# Patient Record
Sex: FEMALE | Race: OTHER | HISPANIC OR LATINO | URBAN - METROPOLITAN AREA
[De-identification: names, ages, dates, MRNs, and addresses within clinical notes are randomized per-mention and may not be internally consistent; named-entity substitution may affect disease eponyms.]

---

## 2017-03-03 ENCOUNTER — OUTPATIENT (OUTPATIENT)
Dept: OUTPATIENT SERVICES | Facility: HOSPITAL | Age: 32
LOS: 1 days | Discharge: HOME | End: 2017-03-03

## 2017-05-15 ENCOUNTER — TRANSCRIPTION ENCOUNTER (OUTPATIENT)
Age: 32
End: 2017-05-15

## 2017-06-27 DIAGNOSIS — R10.9 UNSPECIFIED ABDOMINAL PAIN: ICD-10-CM

## 2017-08-28 ENCOUNTER — TRANSCRIPTION ENCOUNTER (OUTPATIENT)
Age: 32
End: 2017-08-28

## 2018-02-02 ENCOUNTER — OUTPATIENT (OUTPATIENT)
Dept: OUTPATIENT SERVICES | Facility: HOSPITAL | Age: 33
LOS: 1 days | Discharge: HOME | End: 2018-02-02

## 2018-02-02 DIAGNOSIS — Z00.00 ENCOUNTER FOR GENERAL ADULT MEDICAL EXAMINATION WITHOUT ABNORMAL FINDINGS: ICD-10-CM

## 2018-07-06 ENCOUNTER — EMERGENCY (EMERGENCY)
Facility: HOSPITAL | Age: 33
LOS: 0 days | Discharge: HOME | End: 2018-07-06
Attending: EMERGENCY MEDICINE | Admitting: EMERGENCY MEDICINE

## 2018-07-06 VITALS
TEMPERATURE: 99 F | RESPIRATION RATE: 16 BRPM | HEART RATE: 91 BPM | DIASTOLIC BLOOD PRESSURE: 72 MMHG | SYSTOLIC BLOOD PRESSURE: 137 MMHG | OXYGEN SATURATION: 100 %

## 2018-07-06 VITALS
DIASTOLIC BLOOD PRESSURE: 90 MMHG | OXYGEN SATURATION: 100 % | HEART RATE: 102 BPM | TEMPERATURE: 97 F | RESPIRATION RATE: 18 BRPM | SYSTOLIC BLOOD PRESSURE: 158 MMHG

## 2018-07-06 DIAGNOSIS — R10.9 UNSPECIFIED ABDOMINAL PAIN: ICD-10-CM

## 2018-07-06 DIAGNOSIS — R10.2 PELVIC AND PERINEAL PAIN: ICD-10-CM

## 2018-07-06 LAB
ALBUMIN SERPL ELPH-MCNC: 4.5 G/DL — SIGNIFICANT CHANGE UP (ref 3.5–5.2)
ALP SERPL-CCNC: 64 U/L — SIGNIFICANT CHANGE UP (ref 30–115)
ALT FLD-CCNC: 14 U/L — SIGNIFICANT CHANGE UP (ref 0–41)
ANION GAP SERPL CALC-SCNC: 17 MMOL/L — HIGH (ref 7–14)
APPEARANCE UR: (no result)
AST SERPL-CCNC: 16 U/L — SIGNIFICANT CHANGE UP (ref 0–41)
BACTERIA # UR AUTO: (no result) /HPF
BILIRUB SERPL-MCNC: <0.2 MG/DL — SIGNIFICANT CHANGE UP (ref 0.2–1.2)
BILIRUB UR-MCNC: NEGATIVE — SIGNIFICANT CHANGE UP
BUN SERPL-MCNC: 7 MG/DL — LOW (ref 10–20)
CALCIUM SERPL-MCNC: 9.4 MG/DL — SIGNIFICANT CHANGE UP (ref 8.5–10.1)
CHLORIDE SERPL-SCNC: 98 MMOL/L — SIGNIFICANT CHANGE UP (ref 98–110)
CO2 SERPL-SCNC: 23 MMOL/L — SIGNIFICANT CHANGE UP (ref 17–32)
COLOR SPEC: (no result)
CREAT SERPL-MCNC: 0.7 MG/DL — SIGNIFICANT CHANGE UP (ref 0.7–1.5)
DIFF PNL FLD: (no result)
EPI CELLS # UR: (no result) /HPF
GLUCOSE SERPL-MCNC: 87 MG/DL — SIGNIFICANT CHANGE UP (ref 70–99)
GLUCOSE UR QL: NEGATIVE MG/DL — SIGNIFICANT CHANGE UP
HCG SERPL QL: NEGATIVE — SIGNIFICANT CHANGE UP
HCT VFR BLD CALC: 39.2 % — SIGNIFICANT CHANGE UP (ref 37–47)
HGB BLD-MCNC: 12.9 G/DL — SIGNIFICANT CHANGE UP (ref 12–16)
KETONES UR-MCNC: (no result)
LEUKOCYTE ESTERASE UR-ACNC: (no result)
MCHC RBC-ENTMCNC: 29 PG — SIGNIFICANT CHANGE UP (ref 27–31)
MCHC RBC-ENTMCNC: 32.9 G/DL — SIGNIFICANT CHANGE UP (ref 32–37)
MCV RBC AUTO: 88.1 FL — SIGNIFICANT CHANGE UP (ref 81–99)
NITRITE UR-MCNC: NEGATIVE — SIGNIFICANT CHANGE UP
NRBC # BLD: 0 /100 WBCS — SIGNIFICANT CHANGE UP (ref 0–0)
PH UR: 6.5 — SIGNIFICANT CHANGE UP (ref 5–8)
PLATELET # BLD AUTO: 298 K/UL — SIGNIFICANT CHANGE UP (ref 130–400)
POTASSIUM SERPL-MCNC: 3.9 MMOL/L — SIGNIFICANT CHANGE UP (ref 3.5–5)
POTASSIUM SERPL-SCNC: 3.9 MMOL/L — SIGNIFICANT CHANGE UP (ref 3.5–5)
PROT SERPL-MCNC: 7.7 G/DL — SIGNIFICANT CHANGE UP (ref 6–8)
PROT UR-MCNC: 100 MG/DL
RBC # BLD: 4.45 M/UL — SIGNIFICANT CHANGE UP (ref 4.2–5.4)
RBC # FLD: 13.2 % — SIGNIFICANT CHANGE UP (ref 11.5–14.5)
RBC CASTS # UR COMP ASSIST: >50 /HPF
SODIUM SERPL-SCNC: 138 MMOL/L — SIGNIFICANT CHANGE UP (ref 135–146)
SP GR SPEC: 1.01 — SIGNIFICANT CHANGE UP (ref 1.01–1.03)
UROBILINOGEN FLD QL: 0.2 MG/DL — SIGNIFICANT CHANGE UP (ref 0.2–0.2)
WBC # BLD: 10.88 K/UL — HIGH (ref 4.8–10.8)
WBC # FLD AUTO: 10.88 K/UL — HIGH (ref 4.8–10.8)
WBC UR QL: SIGNIFICANT CHANGE UP /HPF

## 2018-07-06 NOTE — ED PROVIDER NOTE - OBJECTIVE STATEMENT
33 y/o F with no PMH but fam hx of ovarian torsion presents with RLQ pelvic pain for the past 4 days. Patient denies any vag discharge. Reports some bleeding. LNMP ~4 weeks ago. No dysuria or hematuria. No flank pain.

## 2018-07-06 NOTE — CONSULT NOTE ADULT - PROBLEM SELECTOR RECOMMENDATION 9
-Unlikely ovarian torsion, likely menses  -TVUS from 2014 reviewed, similar findings oc R ovarian cysts  -No acute GYn intervention  -Torsion precautions given  -Dispo per ED    Dr. Sal aware, Dr. Delgado at bedside.   - -Unlikely ovarian torsion, likely menses  -TVUS from 2014 reviewed, similar findings oc R ovarian cysts  -No acute GYn intervention  -Torsion precautions given  -Dispo per ED    Dr. Sal aware, Dr. Delgado at bedside.

## 2018-07-06 NOTE — ED ADULT NURSE REASSESSMENT NOTE - NS ED NURSE REASSESS COMMENT FT1
Pt is resting comfortably in the bed, no signs of distress. Denies any pain. Personal needs are met. Awaiting for OB GYN consult.

## 2018-07-06 NOTE — ED PROVIDER NOTE - PROGRESS NOTE DETAILS
Patient examined by Dr. Simon from OBGYN. No signs of torsion. Recommends DC with return precautions. Patient is comfortable with the plan,. Will DC.

## 2018-07-06 NOTE — ED PROVIDER NOTE - MEDICAL DECISION MAKING DETAILS
I have full discussed the medical management and delivery of care with the patient. Patient confirms understanding and has been given detailed return precautions. Patient instructed to return to the ED should symptoms persist or worsen. Patient is well appearing upon discharge.

## 2018-07-06 NOTE — CONSULT NOTE ADULT - ATTENDING COMMENTS
On examination patient lying comfortable, reports minimal pain, did not require pain medication in ED. No abdominal tenderness on exam.  US images reviewed. Similar US report in 2014. Unlikely torsion.  D/c home with precautions. F/u Gyn 48-72h.

## 2018-07-06 NOTE — CONSULT NOTE ADULT - SUBJECTIVE AND OBJECTIVE BOX
[id number]    HPI:  OBhx  GYNhx  PMHx  PSHx    Physical Exam:  Vital Signs Last 24 Hrs  T(C): 37 (2018 19:20), Max: 37 (2018 19:20)  T(F): 98.6 (2018 19:20), Max: 98.6 (2018 19:20)  HR: 91 (2018 19:20) (91 - 102)  BP: 137/72 (2018 19:20) (137/72 - 158/90)  RR: 16 (2018 19:20) (16 - 18)  SpO2: 100% (2018 19:20) (100% - 100%)          Labs                        12.9   10.88 )-----------( 298      ( 2018 16:56 )             39.2   07    138  |  98  |  7<L>  ----------------------------<  87  3.9   |  23  |  0.7    Ca    9.4      2018 16:56    TPro  7.7  /  Alb  4.5  /  TBili  <0.2  /  DBili  x   /  AST  16  /  ALT  14  /  AlkPhos  64  07-06  Urinalysis Basic - ( 2018 18:40 )    Color: Red / Appearance: Cloudy / S.010 / pH: x  Gluc: x / Ketone: Trace  / Bili: Negative / Urobili: 0.2 mg/dL   Blood: x / Protein: 100 mg/dL / Nitrite: Negative   Leuk Esterase: Small / RBC: >50 /HPF / WBC 3-5 /HPF   Sq Epi: x / Non Sq Epi: Few /HPF / Bacteria: Few /HPF        Imaging  < from: US Transvaginal (18 @ 18:25) >  UTERUS: Anteverted measuring 6.8 x 4.4 x 3.6 cm, with normal echogenicity   and morphology. The endometrial echo complex measures 0.6 cm, which is   normal in thickness.     RIGHT OVARY: measures 3.3 x 2.8 x 1.8 cm and is mildly heterogeneous with   small follicles majority of the periphery of the ovary. Doppler flow is   demonstrated to the right ovary.     LEFT OVARY: measures 2.9 x 2.1 x 2 cm, and is unremarkable. Doppler flow   is demonstrated to the left ovary.     OTHER: No free fluid in the pelvis.    IMPRESSION:    Heterogeneous right ovary, minimally enlarged when compared to the left   ovary, with small follicles lining the periphery of the right ovary.   Positive Doppler flow demonstrated to the right ovary.  Findings are   equivocal for early ovarian torsion. Recommend clinical correlation and   if indicated OB/GYN consult.    < end of copied text >      Assessment    [id number]    HPI: 33 yo G0, LMP 6/9 presents for abdominal pain and spotting. Pt states 4 days ago she started having RLQ pain, twisting and pulling in nature, 2/10 at worse, intermittent. Pt noted spotting with wiping at the time, did not think it was her menses as she is due next week, but her menses have been irregular. Mild pain continued until last night when she had an episode of sharp, 10/10 RLQ twisting pain, non radiating, while lying down. Pt states at that time she had another episode of spotting noted with wiping. This episode occurred again right before TVUS today. Pt has not taken pain medication. Associated with decreased appetite today and nausea. Hx of R ovarian cysts. Last intercourse 2 weeks ago, last BM 2 days ago normal, no constipated per her. Denies fevers, chills, headache, CP, SOB, vomiting, diarrhea, dysuria, hematuria, or vaginal discharge. Follows with GYn Dr. Meneses in [place], last seen in Feb.    OBhx nulliparous  GYNhx: Irregular menses, was on DMPA till october, started on OCPs in April. Denies hx of PCOS, butstates she has had cysts on her ovary before. Denies fibroids, STIs or abnormal paps  PMHx: Asthma - uses albuterol 1/year, no hospitalizations or intubations  PSHx: lap appendectomy, tonsillectomy  Allergies: Fish, bees,     Physical Exam:  Vital Signs Last 24 Hrs  T(C): 37 (2018 19:20), Max: 37 (2018 19:20)  T(F): 98.6 (2018 19:20), Max: 98.6 (2018 19:20)  HR: 91 (2018 19:20) (91 - 102)  BP: 137/72 (2018 19:20) (137/72 - 158/90)  RR: 16 (2018 19:20) (16 - 18)  SpO2: 100% (2018 19:20) (100% - 100%)          Labs                        12.9   10.88 )-----------( 298      ( 2018 16:56 )             39.2   07-06    138  |  98  |  7<L>  ----------------------------<  87  3.9   |  23  |  0.7    Ca    9.4      2018 16:56    TPro  7.7  /  Alb  4.5  /  TBili  <0.2  /  DBili  x   /  AST  16  /  ALT  14  /  AlkPhos  64  07-06  Urinalysis Basic - ( 2018 18:40 )    Color: Red / Appearance: Cloudy / S.010 / pH: x  Gluc: x / Ketone: Trace  / Bili: Negative / Urobili: 0.2 mg/dL   Blood: x / Protein: 100 mg/dL / Nitrite: Negative   Leuk Esterase: Small / RBC: >50 /HPF / WBC 3-5 /HPF   Sq Epi: x / Non Sq Epi: Few /HPF / Bacteria: Few /HPF        Imaging  < from: US Transvaginal (18 @ 18:25) >  UTERUS: Anteverted measuring 6.8 x 4.4 x 3.6 cm, with normal echogenicity   and morphology. The endometrial echo complex measures 0.6 cm, which is   normal in thickness.     RIGHT OVARY: measures 3.3 x 2.8 x 1.8 cm and is mildly heterogeneous with   small follicles majority of the periphery of the ovary. Doppler flow is   demonstrated to the right ovary.     LEFT OVARY: measures 2.9 x 2.1 x 2 cm, and is unremarkable. Doppler flow   is demonstrated to the left ovary.     OTHER: No free fluid in the pelvis.    IMPRESSION:    Heterogeneous right ovary, minimally enlarged when compared to the left   ovary, with small follicles lining the periphery of the right ovary.   Positive Doppler flow demonstrated to the right ovary.  Findings are   equivocal for early ovarian torsion. Recommend clinical correlation and   if indicated OB/GYN consult.    < end of copied text >      Assessment    [id number]    HPI: 33 yo G0, LMP 6/9 presents for abdominal pain and spotting. Pt states 4 days ago she started having RLQ pain, twisting and pulling in nature, 2/10 at worse, intermittent. Pt noted spotting with wiping at the time, did not think it was her menses as she is due next week, but her menses have been irregular. Mild pain continued until last night when she had an episode of sharp, 10/10 RLQ twisting pain, non radiating, while lying down. Pt states at that time she had another episode of spotting noted with wiping. This episode occurred again right before TVUS today. Pt has not taken pain medication. Associated with decreased appetite today and nausea. Hx of R ovarian cysts. Last intercourse 2 weeks ago, last BM 2 days ago normal, no constipated per her. Denies fevers, chills, headache, CP, SOB, vomiting, diarrhea, dysuria, hematuria, or vaginal discharge. Follows with GYn Dr. Meneses in [place], last seen in Feb.    OBhx nulliparous  GYNhx: Irregular menses, was on DMPA till october, started on OCPs in April. Denies hx of PCOS, butstates she has had cysts on her ovary before. Denies fibroids, STIs or abnormal paps  PMHx: Asthma - uses albuterol 1/year, no hospitalizations or intubations  PSHx: lap appendectomy, tonsillectomy  Allergies: Fish - hives, bees -swelling  FHx: Sister had ovarian torsion. Mother has hx of ovarian cyst and endometriosis  Shx; denies    Physical Exam:  Vital Signs Last 24 Hrs  T(C): 37 (2018 19:20), Max: 37 (2018 19:20)  T(F): 98.6 (2018 19:20), Max: 98.6 (2018 19:20)  HR: 91 (2018 19:20) (91 - 102)  BP: 137/72 (2018 19:20) (137/72 - 158/90)  RR: 16 (2018 19:20) (16 - 18)  SpO2: 100% (2018 19:20) (100% - 100%)    Gen: NAD  CVS: RRR, normal S1, S2  Lungs: CTAB  Abd:    Labs                        12.9   10.88 )-----------( 298      ( 2018 16:56 )             39.2   07-    138  |  98  |  7<L>  ----------------------------<  87  3.9   |  23  |  0.7    Ca    9.4      2018 16:56    TPro  7.7  /  Alb  4.5  /  TBili  <0.2  /  DBili  x   /  AST  16  /  ALT  14  /  AlkPhos  64  -  Urinalysis Basic - ( 2018 18:40 )    Color: Red / Appearance: Cloudy / S.010 / pH: x  Gluc: x / Ketone: Trace  / Bili: Negative / Urobili: 0.2 mg/dL   Blood: x / Protein: 100 mg/dL / Nitrite: Negative   Leuk Esterase: Small / RBC: >50 /HPF / WBC 3-5 /HPF   Sq Epi: x / Non Sq Epi: Few /HPF / Bacteria: Few /HPF        Imaging  < from: US Transvaginal (18 @ 18:25) >  UTERUS: Anteverted measuring 6.8 x 4.4 x 3.6 cm, with normal echogenicity   and morphology. The endometrial echo complex measures 0.6 cm, which is   normal in thickness.     RIGHT OVARY: measures 3.3 x 2.8 x 1.8 cm and is mildly heterogeneous with   small follicles majority of the periphery of the ovary. Doppler flow is   demonstrated to the right ovary.     LEFT OVARY: measures 2.9 x 2.1 x 2 cm, and is unremarkable. Doppler flow   is demonstrated to the left ovary.     OTHER: No free fluid in the pelvis.    IMPRESSION:    Heterogeneous right ovary, minimally enlarged when compared to the left   ovary, with small follicles lining the periphery of the right ovary.   Positive Doppler flow demonstrated to the right ovary.  Findings are   equivocal for early ovarian torsion. Recommend clinical correlation and   if indicated OB/GYN consult.    < end of copied text >

## 2018-07-06 NOTE — CONSULT NOTE ADULT - ASSESSMENT
33 yo G0, LMP 6/9 with mild right sided abdominal pain and small amoutn of vaginal bleeding, likely menses, unlikely ovarian torsion. CLinically and hemodynamically stable.

## 2018-07-07 DIAGNOSIS — R10.2 PELVIC AND PERINEAL PAIN: ICD-10-CM

## 2018-12-12 ENCOUNTER — TRANSCRIPTION ENCOUNTER (OUTPATIENT)
Age: 33
End: 2018-12-12

## 2019-05-12 ENCOUNTER — EMERGENCY (EMERGENCY)
Facility: HOSPITAL | Age: 34
LOS: 0 days | Discharge: HOME | End: 2019-05-12
Admitting: EMERGENCY MEDICINE
Payer: COMMERCIAL

## 2019-05-12 VITALS
OXYGEN SATURATION: 99 % | SYSTOLIC BLOOD PRESSURE: 145 MMHG | TEMPERATURE: 98 F | RESPIRATION RATE: 18 BRPM | HEART RATE: 78 BPM | DIASTOLIC BLOOD PRESSURE: 80 MMHG
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
## 2019-05-12 DIAGNOSIS — S61.012A LACERATION WITHOUT FOREIGN BODY OF LEFT THUMB WITHOUT DAMAGE TO NAIL, INITIAL ENCOUNTER: ICD-10-CM
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
## 2019-05-12 PROCEDURE — 99283 EMERGENCY DEPT VISIT LOW MDM: CPT

## 2019-05-12 RX ORDER — CEPHALEXIN 500 MG
1 CAPSULE ORAL
Qty: 28 | Refills: 0
Start: 2019-05-12 | End: 2019-05-18

## 2019-05-12 NOTE — ED PROVIDER NOTE - OBJECTIVE STATEMENT
laceration right thumb with  34 yo F with no pmhx presenting with laceration to edge nail sustained about 1 hours ago accidently cut with . Symptoms are mild. Reports bleeding to site. Reports being up to date with tetanus. No redness, swelling, fever, or chills.

## 2019-05-12 NOTE — ED PROVIDER NOTE - NS ED ROS FT
Review of Systems:  	•	CONSTITUTIONAL - no fever, no diaphoresis, no chills  	•	SKIN - +laceration, no rash  	•	HEMATOLOGIC - +bleeding, no bruising  	•	EYES - no eye pain, no blurry vision  	•	ENT - no congestion  	•	MUSCULOSKELETAL - no joint paint, no swelling, no redness  	•	NEUROLOGIC - no weakness, no headache, no paresthesias, no LOC  	•	PSYCH - no anxiety, no depression  	All other ROS are negative except as documented in HPI.

## 2019-05-12 NOTE — ED PROVIDER NOTE - PHYSICAL EXAMINATION
VITAL SIGNS: I have reviewed nursing notes and confirm.  CONSTITUTIONAL: Well-developed; well-nourished; in no acute distress.  SKIN: 0.5cm laceration to distal left thumb at edge of thumb with minimal bleeding. Remainder Skin exam is warm and dry, no acute rash.  EXT: Normal ROM. No edema. Full ROM.   NEURO: Alert, oriented. Grossly unremarkable. No focal deficits.  PSYCH: Cooperative, appropriate.

## 2019-05-12 NOTE — ED PROVIDER NOTE - CLINICAL SUMMARY MEDICAL DECISION MAKING FREE TEXT BOX
32 yo F with no pmhx presenting with laceration to edge nail sustained about 1 hours ago accidently cut with . Removed powder gel nail. Small finger laceration to edge of nail. After a few minutes of applying pressure 34 yo F with no pmhx presenting with laceration to edge nail sustained about 1 hours ago accidently cut with . Removed powder gel nail. Small 0.5cm superficial finger laceration to edge of nail. After a few minutes of applying pressure bleeding subsided. Gelfoam applied with gauze. Keflex given. Tdap up to date. I have discussed the discharge plan with the patient. The patient agrees with the plan, as discussed.  The patient understands Emergency Department diagnosis is a preliminary diagnosis often based on limited information and that the patient must adhere to the follow-up plan as discussed.  The patient understands that if the symptoms worsen or if prescribed medications do not have the desired/planned effect that the patient may return to the Emergency Department at any time for further evaluation and treatment.

## 2019-05-14 NOTE — ED PROCEDURE NOTE - GENERAL PROCEDURE DETAILS
Explored wound small laceration lateral to nailbed. Bleeding subsided. Dressed wound with gelfoam and gauze.

## 2019-10-28 NOTE — ED ADULT NURSE NOTE - NS PRO PASSIVE SMOKE EXP
Reason for Call: Update    Detailed comments: Patient states that antibiotic is working.  Still has rash under arm so she will continue to use medications.  It has got a lot better though and has no more pain.      Phone Number Patient can be reached at: Home number on file [phone] (home)    Best Time: Any    Can we leave a detailed message on this number? YES    Call taken on 10/28/2019 at 4:15 PM by Teresita Rao     No

## 2020-03-09 ENCOUNTER — TRANSCRIPTION ENCOUNTER (OUTPATIENT)
Age: 35
End: 2020-03-09

## 2020-04-26 ENCOUNTER — MESSAGE (OUTPATIENT)
Age: 35
End: 2020-04-26

## 2020-07-08 NOTE — ED ADULT NURSE NOTE - CCCP TRG CHIEF CMPLNT
Patient called with x-ray results.  She verbalizes understanding.  She is feeling much better on the steroid.  She agrees to proceed with physical therapy with [facility].  Please proceed with referral.  Also, please send copy of lumbar radiology report to [facility], done yesterday.    Thank you, Darlin   abdominal pain

## 2020-11-15 ENCOUNTER — TRANSCRIPTION ENCOUNTER (OUTPATIENT)
Age: 35
End: 2020-11-15

## 2021-08-04 ENCOUNTER — TRANSCRIPTION ENCOUNTER (OUTPATIENT)
Age: 36
End: 2021-08-04

## 2021-08-04 ENCOUNTER — OUTPATIENT (OUTPATIENT)
Dept: OUTPATIENT SERVICES | Facility: HOSPITAL | Age: 36
LOS: 1 days | Discharge: HOME | End: 2021-08-04

## 2021-08-04 ENCOUNTER — RESULT REVIEW (OUTPATIENT)
Age: 36
End: 2021-08-04

## 2021-08-04 ENCOUNTER — APPOINTMENT (OUTPATIENT)
Dept: PODIATRY | Facility: CLINIC | Age: 36
End: 2021-08-04

## 2021-08-04 DIAGNOSIS — S92.502A DISPLACED UNSPECIFIED FRACTURE OF LEFT LESSER TOE(S), INITIAL ENCOUNTER FOR CLOSED FRACTURE: ICD-10-CM

## 2021-08-04 PROBLEM — Z78.9 OTHER SPECIFIED HEALTH STATUS: Chronic | Status: ACTIVE | Noted: 2019-05-12

## 2021-08-04 PROBLEM — Z00.00 ENCOUNTER FOR PREVENTIVE HEALTH EXAMINATION: Status: ACTIVE | Noted: 2021-08-04

## 2021-08-05 ENCOUNTER — OUTPATIENT (OUTPATIENT)
Dept: OUTPATIENT SERVICES | Facility: HOSPITAL | Age: 36
LOS: 1 days | Discharge: HOME | End: 2021-08-05
Payer: COMMERCIAL

## 2021-08-05 DIAGNOSIS — S92.502A DISPLACED UNSPECIFIED FRACTURE OF LEFT LESSER TOE(S), INITIAL ENCOUNTER FOR CLOSED FRACTURE: ICD-10-CM

## 2021-08-05 DIAGNOSIS — M79.675 PAIN IN LEFT TOE(S): ICD-10-CM

## 2021-08-05 PROCEDURE — 73630 X-RAY EXAM OF FOOT: CPT | Mod: 26,LT

## 2023-07-13 NOTE — ED ADULT TRIAGE NOTE - NS ED NOTE AC HIGH RISK COUNTRIES
No Sotyktu Pregnancy And Lactation Text: There is insufficient data to evaluate whether or not Sotyktu is safe to use during pregnancy.   It is not known if Sotyktu passes into breast milk and whether or not it is safe to use when breastfeeding.

## 2025-02-10 ENCOUNTER — APPOINTMENT (OUTPATIENT)
Dept: ORTHOPEDIC SURGERY | Facility: CLINIC | Age: 40
End: 2025-02-10
Payer: COMMERCIAL

## 2025-02-10 ENCOUNTER — NON-APPOINTMENT (OUTPATIENT)
Age: 40
End: 2025-02-10

## 2025-02-10 VITALS — HEIGHT: 63 IN | BODY MASS INDEX: 31.89 KG/M2 | WEIGHT: 180 LBS

## 2025-02-10 DIAGNOSIS — S89.92XA UNSPECIFIED INJURY OF LEFT LOWER LEG, INITIAL ENCOUNTER: ICD-10-CM

## 2025-02-10 PROCEDURE — 99203 OFFICE O/P NEW LOW 30 MIN: CPT

## 2025-02-10 PROCEDURE — 73562 X-RAY EXAM OF KNEE 3: CPT | Mod: LT

## 2025-02-10 RX ORDER — IBUPROFEN 800 MG/1
800 TABLET ORAL 3 TIMES DAILY
Qty: 60 | Refills: 1 | Status: ACTIVE | COMMUNITY
Start: 2025-02-10 | End: 1900-01-01

## 2025-03-27 ENCOUNTER — APPOINTMENT (OUTPATIENT)
Dept: ORTHOPEDIC SURGERY | Facility: CLINIC | Age: 40
End: 2025-03-27

## 2025-06-23 NOTE — ED ADULT NURSE NOTE - NS ED NURSE LEVEL OF CONSCIOUSNESS MENTAL STATUS
No swimming until stiches fall out.  Apply lots of sunscreen.    Wound Closure with Sutures in Children    Your child was seen in the Emergency Department with a cut that required closure with stitches (sutures).  These will hold your child’s skin together while it heals.  They also make it less likely that your child will have a scar.    Sutures can be made from natural or synthetic materials. They can be made from a material that your body can break down as your wound heals (absorbable), or they can be made from a material that needs to be removed from your skin (nonabsorbable).  Sutures are strong and can be used for all kinds of wounds. Absorbable sutures may be used to close tissues deep under the skin. Nonabsorbable sutures need to be removed.    3 deep and 7 superficial stitches were placed.      General tips for taking care of a child who has stitches placed:  If your sutures are ABSORBABLE, they should come out on their own.  But, if they are still there in 10 days, they should be removed.    If your sutures are NON-ABSORBABLE, they should be removed in _____ days to prevent a more prominent scar.    (REFERENCE – INCLUDE TIMEFREAME ABOVE  Scalp: 5-7 days  Face: 5 days  Trunk: 7 days  Hand: 7 days  Non-Joint Extremities: 7-10 days  Sole/foot: 10 days  Joint surfaces: 10-14 days)    HOW TO CARE FOR A WOUND  -Take medicines only as told by your doctor.  -If you were prescribed an antibiotic medicine for your wound, finish it all even if you start to feel better.  -It is generally considered better to have a wound gooey and covered (use an antibiotic ointment and cover with gauze or a Band-Aid).  -Wash your hands with soap and water before and after touching your wound.  -Do not soak your wound in water. Do not take baths, swim, or use a hot tub until your doctor says it is okay.  -After 24 hours you can shower.  -Do not take out your own sutures or staples.  -Do not pick at your wound. Picking can cause an infection.  -Keep all follow-up visits as told by your doctor. This is important.    If you notice signs of infection (worsening pain, swelling, surrounding erythema, fevers, pus draining), seek medical attention.      It takes skin about 6 months to fully heal.  To help prevent a prominent scar, be extra cautious about sun exposure; use sunscreen to prevent sunburn or suntan.    Follow up with your pediatrician in 1-2 days to make sure that your child is doing better.    Return to the Emergency Department if your child has:  -Fever or chills.  -Redness, puffiness (swelling), or pain at the site of the wound.  -There is fluid, blood, or pus coming from the wound.  -There is a bad smell coming from the wound.
Alert/Awake